# Patient Record
Sex: FEMALE | Race: WHITE | NOT HISPANIC OR LATINO
[De-identification: names, ages, dates, MRNs, and addresses within clinical notes are randomized per-mention and may not be internally consistent; named-entity substitution may affect disease eponyms.]

---

## 2017-02-20 ENCOUNTER — RX RENEWAL (OUTPATIENT)
Age: 67
End: 2017-02-20

## 2017-03-20 ENCOUNTER — RX RENEWAL (OUTPATIENT)
Age: 67
End: 2017-03-20

## 2017-04-19 ENCOUNTER — RX RENEWAL (OUTPATIENT)
Age: 67
End: 2017-04-19

## 2017-05-08 ENCOUNTER — RX RENEWAL (OUTPATIENT)
Age: 67
End: 2017-05-08

## 2017-07-10 ENCOUNTER — RX RENEWAL (OUTPATIENT)
Age: 67
End: 2017-07-10

## 2018-12-15 ENCOUNTER — TRANSCRIPTION ENCOUNTER (OUTPATIENT)
Age: 68
End: 2018-12-15

## 2018-12-16 ENCOUNTER — EMERGENCY (EMERGENCY)
Facility: HOSPITAL | Age: 68
LOS: 0 days | Discharge: HOME | End: 2018-12-16
Attending: EMERGENCY MEDICINE | Admitting: EMERGENCY MEDICINE
Payer: COMMERCIAL

## 2018-12-16 VITALS — WEIGHT: 126.99 LBS | HEIGHT: 62 IN

## 2018-12-16 VITALS
TEMPERATURE: 99 F | HEART RATE: 59 BPM | OXYGEN SATURATION: 100 % | SYSTOLIC BLOOD PRESSURE: 152 MMHG | RESPIRATION RATE: 18 BRPM | DIASTOLIC BLOOD PRESSURE: 78 MMHG

## 2018-12-16 DIAGNOSIS — R21 RASH AND OTHER NONSPECIFIC SKIN ERUPTION: ICD-10-CM

## 2018-12-16 DIAGNOSIS — B02.9 ZOSTER WITHOUT COMPLICATIONS: ICD-10-CM

## 2018-12-16 PROCEDURE — 99284 EMERGENCY DEPT VISIT MOD MDM: CPT

## 2018-12-16 RX ORDER — MORPHINE SULFATE 50 MG/1
4 CAPSULE, EXTENDED RELEASE ORAL ONCE
Qty: 0 | Refills: 0 | Status: DISCONTINUED | OUTPATIENT
Start: 2018-12-16 | End: 2018-12-16

## 2018-12-16 RX ORDER — ACYCLOVIR SODIUM 500 MG
600 VIAL (EA) INTRAVENOUS ONCE
Qty: 0 | Refills: 0 | Status: COMPLETED | OUTPATIENT
Start: 2018-12-16 | End: 2018-12-16

## 2018-12-16 RX ORDER — ONDANSETRON 8 MG/1
4 TABLET, FILM COATED ORAL ONCE
Qty: 0 | Refills: 0 | Status: COMPLETED | OUTPATIENT
Start: 2018-12-16 | End: 2018-12-16

## 2018-12-16 RX ADMIN — MORPHINE SULFATE 4 MILLIGRAM(S): 50 CAPSULE, EXTENDED RELEASE ORAL at 18:54

## 2018-12-16 RX ADMIN — ONDANSETRON 4 MILLIGRAM(S): 8 TABLET, FILM COATED ORAL at 18:54

## 2018-12-16 RX ADMIN — Medication 112 MILLIGRAM(S): at 20:53

## 2018-12-16 NOTE — ED STATDOCS - OBJECTIVE STATEMENT
69 y/o female with no relevant PMHx presents to the ED c/o Shingles rash on left side of face. Pt notes she was seen at urgent care, diagnosed with Shingles and prescribed Veltrax 4 days ago. Pt reports when she woke up this AM, she had swelling of face and worsening pain, describes the pain as "feeling it in the bone".

## 2018-12-16 NOTE — ED STATDOCS - SKIN, MLM
skin normal color for race, warm, dry and intact. +shingles left side of face, V1 V2 V3 region, no overlying cellulitis

## 2018-12-16 NOTE — ED STATDOCS - MEDICAL DECISION MAKING DETAILS
IV acyclovir Pt with Zoster to the face.  No eye involvement.  Discussed with Dr. Mitchell on phone.  Agrees with IV dose acylcovir in ED, continue valtrex at home, pain meds, f/u as outpatient.

## 2018-12-16 NOTE — ED STATDOCS - PROGRESS NOTE DETAILS
Carolina MENESES for Dr. Prince: 69 y/o female with no relevant PMHx  presents to the ED c/o Shingles rash on left side of face. Pt notes she was seen at urgent care, diagnosed with Shingles and prescribed Veltrax 4 days ago. Pt reports when she woke up this AM, she had swelling of face and worsening pain, describes it as "feeling it in the bone". Pt with Shingles in V1, V2, V3, concerned for disseminated herpes Zoster, will send pt to main ED for further evaluation. Spoke with infectious disease Dr. Mitchell who advised pt can be discharged on oral anti-virals, will not need to be admitted for IV anti-virals, will give dose of Iv acyclovir and plan to d/c home c/w valtrex which was prescribed by urgent care.  Gabbie Bnada PA-C Pt feeling better after pain control, IV acyclovir given.  Plan to d/c home with outpt f/u with PMD, strict return precautions given if shingles spread to the eye, or worsen, pt verbalized understanding.  Plan to d/c home with outpt f/u, pt already has a valtrex Rx given by urgent care, pt agreeable to d/c and plan of care, all questions answered, return precautions given  Gabbie Banda PA-C Pt feeling better after pain control, IV acyclovir given.  Plan to d/c home with outpt f/u with PMD, strict return precautions given if shingles spreads to the eye, or sx worsen, pt verbalized understanding.  Plan to d/c home with outpt f/u, pt already has a valtrex Rx given by urgent care, pt agreeable to d/c and plan of care, all questions answered, return precautions given  Gabbie Banda PA-C

## 2018-12-16 NOTE — ED ADULT NURSE NOTE - OBJECTIVE STATEMENT
Pt reports L ear pain Thursday night. Seen at /prescribed medication. Pt awoke this am with painful rash to L face from eye to chin.

## 2019-01-09 ENCOUNTER — TRANSCRIPTION ENCOUNTER (OUTPATIENT)
Age: 69
End: 2019-01-09

## 2019-02-20 PROBLEM — B02.9 ZOSTER WITHOUT COMPLICATIONS: Chronic | Status: ACTIVE | Noted: 2018-12-16

## 2019-03-04 ENCOUNTER — APPOINTMENT (OUTPATIENT)
Dept: CARDIOLOGY | Facility: CLINIC | Age: 69
End: 2019-03-04
Payer: MEDICARE

## 2019-03-04 ENCOUNTER — NON-APPOINTMENT (OUTPATIENT)
Age: 69
End: 2019-03-04

## 2019-03-04 VITALS
HEIGHT: 61 IN | SYSTOLIC BLOOD PRESSURE: 141 MMHG | WEIGHT: 147 LBS | OXYGEN SATURATION: 98 % | HEART RATE: 68 BPM | BODY MASS INDEX: 27.75 KG/M2 | DIASTOLIC BLOOD PRESSURE: 76 MMHG

## 2019-03-04 PROCEDURE — 99214 OFFICE O/P EST MOD 30 MIN: CPT | Mod: 25

## 2019-03-04 PROCEDURE — 93000 ELECTROCARDIOGRAM COMPLETE: CPT

## 2019-03-04 RX ORDER — ATORVASTATIN CALCIUM 40 MG/1
40 TABLET, FILM COATED ORAL
Qty: 1 | Refills: 0 | Status: DISCONTINUED | COMMUNITY
Start: 2017-02-20 | End: 2019-03-04

## 2019-03-04 NOTE — REASON FOR VISIT
[Follow-Up - Clinic] : a clinic follow-up of [Abnormal ECG] : an abnormal ECG [Coronary Artery Disease] : coronary artery disease [Hyperlipidemia] : hyperlipidemia [Hypertension] : hypertension [Medication Management] : Medication management [Palpitations] : palpitations

## 2019-03-04 NOTE — REVIEW OF SYSTEMS
[Fever] : no fever [Blurry Vision] : no blurred vision [Earache] : no earache [Shortness Of Breath] : no shortness of breath [Dyspnea on exertion] : dyspnea during exertion [Chest  Pressure] : no chest pressure [Chest Pain] : no chest pain [Lower Ext Edema] : no extremity edema [Leg Claudication] : no intermittent leg claudication [Palpitations] : no palpitations [see HPI] : see HPI [Abdominal Pain] : no abdominal pain [Nausea] : no nausea [Vomiting] : no vomiting [Heartburn] : heartburn [Change in Appetite] : no change in appetite [Dysphagia] : no dysphagia [Dysuria] : no dysuria [Incontinence] : no incontinence [Joint Pain] : no joint pain [Muscle Cramps] : no muscle cramps [Skin: A Rash] : no rash: [Skin Lesions] : no skin lesions [Dizziness] : no dizziness [Convulsions] : no convulsions [Confusion] : no confusion was observed [Anxiety] : anxiety [Excessive Thirst] : no polydipsia [Easy Bleeding] : no tendency for easy bleeding

## 2019-03-04 NOTE — HISTORY OF PRESENT ILLNESS
[FreeTextEntry1] : Patient  with hx  of hypertension, gerd ,CAD D1  Gastritis who came for follow up after a long gap as she was out of the state living in [place] \par she says she is doing good    patient denies any active cardiac symptoms denies any chest pain or shortness of breath or palpitation \par \par \par Patient did have  PCI Diagonal branch ,  was placed on calcium channel blocker  .  patient coronary angiogram showed  diagonal disease severe disease which was stented , coronaries were very spasmodic while having angiography was placed on low dose norvasc ,\par \par \par \par had echocardiogram showed decreased LVEF 41%, paradoxical septal motion , with apicoseptal , mid inferoseptal wall fixed defect ,without significant ischemia , \par \par \par

## 2019-03-04 NOTE — DISCUSSION/SUMMARY
[Bundle Branch Block] : ~T bundle branch block [Hyperlipidemia] : hyperlipidemia [Hypertension] : hypertension [Stable] : stable [None] : none

## 2019-03-04 NOTE — PHYSICAL EXAM
[General Appearance - Well Developed] : well developed [Normal Conjunctiva] : the conjunctiva exhibited no abnormalities [Normal Oral Mucosa] : normal oral mucosa [Respiration, Rhythm And Depth] : normal respiratory rhythm and effort [Exaggerated Use Of Accessory Muscles For Inspiration] : no accessory muscle use [Auscultation Breath Sounds / Voice Sounds] : lungs were clear to auscultation bilaterally [Chest Palpation] : palpation of the chest revealed no abnormalities [Lungs Percussion] : the lungs were normal to percussion [Heart Rate And Rhythm] : heart rate and rhythm were normal [Heart Sounds] : normal S1 and S2 [Murmurs] : no murmurs present [Arterial Pulses Normal] : the arterial pulses were normal [Edema] : no peripheral edema present [Bowel Sounds] : normal bowel sounds [Abdomen Soft] : soft [Abdomen Tenderness] : non-tender [] : no hepato-splenomegaly [Abdomen Mass (___ Cm)] : no abdominal mass palpated [Abnormal Walk] : normal gait [Nail Clubbing] : no clubbing of the fingernails [Cyanosis, Localized] : no localized cyanosis [Skin Turgor] : normal skin turgor [Oriented To Time, Place, And Person] : oriented to person, place, and time [Impaired Insight] : insight and judgment were intact [Normal Appearance] : was normal in appearance [Neck Supple] : was supple

## 2019-03-15 ENCOUNTER — APPOINTMENT (OUTPATIENT)
Dept: CARDIOLOGY | Facility: CLINIC | Age: 69
End: 2019-03-15
Payer: MEDICARE

## 2019-03-15 PROCEDURE — 93306 TTE W/DOPPLER COMPLETE: CPT

## 2019-04-15 ENCOUNTER — NON-APPOINTMENT (OUTPATIENT)
Age: 69
End: 2019-04-15

## 2019-04-15 ENCOUNTER — APPOINTMENT (OUTPATIENT)
Dept: CARDIOLOGY | Facility: CLINIC | Age: 69
End: 2019-04-15
Payer: MEDICARE

## 2019-04-15 VITALS
OXYGEN SATURATION: 97 % | TEMPERATURE: 98.7 F | SYSTOLIC BLOOD PRESSURE: 143 MMHG | RESPIRATION RATE: 14 BRPM | HEIGHT: 61 IN | BODY MASS INDEX: 24.75 KG/M2 | HEART RATE: 92 BPM | DIASTOLIC BLOOD PRESSURE: 80 MMHG | WEIGHT: 131.06 LBS

## 2019-04-15 PROCEDURE — 99214 OFFICE O/P EST MOD 30 MIN: CPT | Mod: 25

## 2019-04-15 PROCEDURE — 93000 ELECTROCARDIOGRAM COMPLETE: CPT

## 2019-04-15 RX ORDER — OMEPRAZOLE 20 MG/1
20 CAPSULE, DELAYED RELEASE ORAL
Qty: 90 | Refills: 1 | Status: ACTIVE | COMMUNITY

## 2019-04-15 NOTE — REVIEW OF SYSTEMS
[Blurry Vision] : no blurred vision [Fever] : no fever [Shortness Of Breath] : no shortness of breath [Earache] : no earache [Chest  Pressure] : no chest pressure [Dyspnea on exertion] : not dyspnea during exertion [Lower Ext Edema] : no extremity edema [Chest Pain] : no chest pain [Palpitations] : no palpitations [Leg Claudication] : no intermittent leg claudication [see HPI] : see HPI [Abdominal Pain] : no abdominal pain [Nausea] : no nausea [Vomiting] : no vomiting [Heartburn] : no heartburn [Dysphagia] : no dysphagia [Change in Appetite] : no change in appetite [Incontinence] : no incontinence [Dysuria] : no dysuria [Joint Pain] : no joint pain [Muscle Cramps] : no muscle cramps [Skin: A Rash] : no rash: [Skin Lesions] : no skin lesions [Dizziness] : no dizziness [Convulsions] : no convulsions [Confusion] : no confusion was observed [Anxiety] : anxiety [Easy Bleeding] : no tendency for easy bleeding [Excessive Thirst] : no polydipsia

## 2019-04-15 NOTE — PHYSICAL EXAM
[General Appearance - Well Developed] : well developed [Normal Conjunctiva] : the conjunctiva exhibited no abnormalities [Respiration, Rhythm And Depth] : normal respiratory rhythm and effort [Normal Oral Mucosa] : normal oral mucosa [Auscultation Breath Sounds / Voice Sounds] : lungs were clear to auscultation bilaterally [Exaggerated Use Of Accessory Muscles For Inspiration] : no accessory muscle use [Chest Palpation] : palpation of the chest revealed no abnormalities [Lungs Percussion] : the lungs were normal to percussion [Heart Rate And Rhythm] : heart rate and rhythm were normal [Heart Sounds] : normal S1 and S2 [Arterial Pulses Normal] : the arterial pulses were normal [Murmurs] : no murmurs present [Bowel Sounds] : normal bowel sounds [Edema] : no peripheral edema present [] : no hepato-splenomegaly [Abdomen Soft] : soft [Abdomen Tenderness] : non-tender [Abdomen Mass (___ Cm)] : no abdominal mass palpated [Abnormal Walk] : normal gait [Skin Turgor] : normal skin turgor [Cyanosis, Localized] : no localized cyanosis [Nail Clubbing] : no clubbing of the fingernails [Oriented To Time, Place, And Person] : oriented to person, place, and time [Impaired Insight] : insight and judgment were intact [Normal Appearance] : was normal in appearance [Neck Supple] : was supple

## 2019-04-15 NOTE — CARDIOLOGY SUMMARY
[Fixed Defect] : fixed defect [LVEF ___%] : LVEF [unfilled]% [___] : [unfilled] [Moderate] : moderate LV dysfunction

## 2019-04-15 NOTE — HISTORY OF PRESENT ILLNESS
[FreeTextEntry1] : Patient  with hx  of hypertension, gerd ,CAD D1  Gastritis who came for follow up after the tests , Patient blood work showed elevated total cholesterol , increased the dose of medication to 20 mg po daily , her echo showed decreased EF compared to prior \par she says she is doing good    patient denies any active cardiac symptoms denies any chest pain or shortness of breath or palpitation \par \par \par Patient did have  PCI Diagonal branch ,  was placed on calcium channel blocker  .  patient coronary angiogram showed  diagonal disease severe disease which was stented , coronaries were very spasmodic while having angiography was placed on low dose norvasc ,\par \par had echocardiogram showed decreased LVEF 41%, paradoxical septal motion , with apicoseptal , mid inferoseptal wall fixed defect ,without significant ischemia , \par \par \par

## 2019-05-07 ENCOUNTER — APPOINTMENT (OUTPATIENT)
Dept: CARDIOLOGY | Facility: CLINIC | Age: 69
End: 2019-05-07
Payer: MEDICARE

## 2019-05-07 PROCEDURE — A9500: CPT

## 2019-05-07 PROCEDURE — 93015 CV STRESS TEST SUPVJ I&R: CPT

## 2019-05-07 PROCEDURE — 78452 HT MUSCLE IMAGE SPECT MULT: CPT

## 2019-07-15 ENCOUNTER — APPOINTMENT (OUTPATIENT)
Dept: CARDIOLOGY | Facility: CLINIC | Age: 69
End: 2019-07-15

## 2019-08-06 ENCOUNTER — TRANSCRIPTION ENCOUNTER (OUTPATIENT)
Age: 69
End: 2019-08-06

## 2019-09-23 ENCOUNTER — MEDICATION RENEWAL (OUTPATIENT)
Age: 69
End: 2019-09-23

## 2019-12-27 ENCOUNTER — RX RENEWAL (OUTPATIENT)
Age: 69
End: 2019-12-27

## 2019-12-30 ENCOUNTER — MEDICATION RENEWAL (OUTPATIENT)
Age: 69
End: 2019-12-30

## 2019-12-30 ENCOUNTER — RX RENEWAL (OUTPATIENT)
Age: 69
End: 2019-12-30

## 2020-03-13 ENCOUNTER — RX RENEWAL (OUTPATIENT)
Age: 70
End: 2020-03-13

## 2020-04-06 ENCOUNTER — RX RENEWAL (OUTPATIENT)
Age: 70
End: 2020-04-06

## 2020-06-25 ENCOUNTER — RX RENEWAL (OUTPATIENT)
Age: 70
End: 2020-06-25

## 2020-08-17 ENCOUNTER — TRANSCRIPTION ENCOUNTER (OUTPATIENT)
Age: 70
End: 2020-08-17

## 2020-08-24 ENCOUNTER — NON-APPOINTMENT (OUTPATIENT)
Age: 70
End: 2020-08-24

## 2020-08-24 ENCOUNTER — APPOINTMENT (OUTPATIENT)
Dept: CARDIOLOGY | Facility: CLINIC | Age: 70
End: 2020-08-24
Payer: MEDICARE

## 2020-08-24 VITALS
OXYGEN SATURATION: 96 % | BODY MASS INDEX: 24.92 KG/M2 | HEART RATE: 93 BPM | WEIGHT: 132 LBS | DIASTOLIC BLOOD PRESSURE: 92 MMHG | HEIGHT: 61 IN | SYSTOLIC BLOOD PRESSURE: 151 MMHG

## 2020-08-24 PROCEDURE — 99214 OFFICE O/P EST MOD 30 MIN: CPT

## 2020-08-24 PROCEDURE — 93000 ELECTROCARDIOGRAM COMPLETE: CPT

## 2020-08-24 NOTE — PHYSICAL EXAM
[General Appearance - Well Developed] : well developed [Normal Conjunctiva] : the conjunctiva exhibited no abnormalities [Normal Oral Mucosa] : normal oral mucosa [Respiration, Rhythm And Depth] : normal respiratory rhythm and effort [Exaggerated Use Of Accessory Muscles For Inspiration] : no accessory muscle use [Auscultation Breath Sounds / Voice Sounds] : lungs were clear to auscultation bilaterally [Chest Palpation] : palpation of the chest revealed no abnormalities [Lungs Percussion] : the lungs were normal to percussion [Heart Sounds] : normal S1 and S2 [Murmurs] : no murmurs present [Heart Rate And Rhythm] : heart rate and rhythm were normal [Arterial Pulses Normal] : the arterial pulses were normal [Edema] : no peripheral edema present [Abdomen Soft] : soft [Bowel Sounds] : normal bowel sounds [Abdomen Tenderness] : non-tender [Abdomen Mass (___ Cm)] : no abdominal mass palpated [] : no hepato-splenomegaly [Abnormal Walk] : normal gait [Nail Clubbing] : no clubbing of the fingernails [Cyanosis, Localized] : no localized cyanosis [Oriented To Time, Place, And Person] : oriented to person, place, and time [Skin Turgor] : normal skin turgor [Impaired Insight] : insight and judgment were intact [Normal Appearance] : was normal in appearance [Neck Supple] : was supple

## 2020-08-24 NOTE — HISTORY OF PRESENT ILLNESS
[FreeTextEntry1] : Patient  with hx  of hypertension, gerd ,CAD D1  Gastritis who came for follow up after prolonged gap , patient ran out of the medication  for last one week ,\par \par  Patient blood work showed elevated total cholesterol , increased the dose of medication to 20 mg po daily , her echo showed decreased EF compared to prior \par \par she says she is doing good    patient denies any active cardiac symptoms denies any chest pain or shortness of breath or palpitation  biking regularly 13 blocks , \par \par \par Patient did have  PCI Diagonal branch ,  was placed on calcium channel blocker  .  patient coronary angiogram showed  diagonal disease severe disease which was stented , coronaries were very spasmodic while having angiography was placed on low dose norvasc ,\par \par had echocardiogram showed decreased LVEF 41%, paradoxical septal motion , with apicoseptal , mid inferoseptal wall fixed defect ,without significant ischemia , \par \par \par

## 2020-08-24 NOTE — CARDIOLOGY SUMMARY
[Fixed Defect] : fixed defect [LVEF ___%] : LVEF [unfilled]% [Moderate] : moderate LV dysfunction [___] : [unfilled] [___] : [unfilled]

## 2020-08-24 NOTE — REASON FOR VISIT
[Abnormal ECG] : an abnormal ECG [Follow-Up - Clinic] : a clinic follow-up of [Hypertension] : hypertension [Hyperlipidemia] : hyperlipidemia [Coronary Artery Disease] : coronary artery disease [Medication Management] : Medication management [Palpitations] : palpitations

## 2020-08-24 NOTE — REVIEW OF SYSTEMS
[see HPI] : see HPI [Anxiety] : anxiety [Blurry Vision] : no blurred vision [Fever] : no fever [Earache] : no earache [Shortness Of Breath] : no shortness of breath [Dyspnea on exertion] : not dyspnea during exertion [Chest Pain] : no chest pain [Chest  Pressure] : no chest pressure [Leg Claudication] : no intermittent leg claudication [Palpitations] : no palpitations [Lower Ext Edema] : no extremity edema [Nausea] : no nausea [Abdominal Pain] : no abdominal pain [Vomiting] : no vomiting [Heartburn] : no heartburn [Dysphagia] : no dysphagia [Change in Appetite] : no change in appetite [Dysuria] : no dysuria [Joint Pain] : no joint pain [Incontinence] : no incontinence [Skin Lesions] : no skin lesions [Muscle Cramps] : no muscle cramps [Skin: A Rash] : no rash: [Confusion] : no confusion was observed [Dizziness] : no dizziness [Convulsions] : no convulsions [Excessive Thirst] : no polydipsia [Easy Bleeding] : no tendency for easy bleeding

## 2020-09-17 ENCOUNTER — APPOINTMENT (OUTPATIENT)
Dept: CARDIOLOGY | Facility: CLINIC | Age: 70
End: 2020-09-17
Payer: MEDICARE

## 2020-09-17 PROCEDURE — 93880 EXTRACRANIAL BILAT STUDY: CPT

## 2020-09-17 PROCEDURE — 93306 TTE W/DOPPLER COMPLETE: CPT

## 2020-10-19 ENCOUNTER — NON-APPOINTMENT (OUTPATIENT)
Age: 70
End: 2020-10-19

## 2020-10-19 ENCOUNTER — APPOINTMENT (OUTPATIENT)
Dept: CARDIOLOGY | Facility: CLINIC | Age: 70
End: 2020-10-19
Payer: MEDICARE

## 2020-10-19 VITALS
OXYGEN SATURATION: 94 % | DIASTOLIC BLOOD PRESSURE: 76 MMHG | SYSTOLIC BLOOD PRESSURE: 129 MMHG | HEART RATE: 69 BPM | HEIGHT: 61 IN

## 2020-10-19 PROCEDURE — 99214 OFFICE O/P EST MOD 30 MIN: CPT | Mod: 25

## 2020-10-19 PROCEDURE — 93000 ELECTROCARDIOGRAM COMPLETE: CPT

## 2020-10-19 PROCEDURE — 99072 ADDL SUPL MATRL&STAF TM PHE: CPT

## 2020-10-19 NOTE — CARDIOLOGY SUMMARY
[Fixed Defect] : fixed defect [___] : [unfilled] [LVEF ___%] : LVEF [unfilled]% [Moderate] : moderate LV dysfunction

## 2020-10-20 NOTE — PHYSICAL EXAM
[Normal Appearance] : normal appearance [General Appearance - Well Developed] : well developed [Well Groomed] : well groomed [No Deformities] : no deformities [General Appearance - Well Nourished] : well nourished [General Appearance - In No Acute Distress] : no acute distress [Respiration, Rhythm And Depth] : normal respiratory rhythm and effort [] : no respiratory distress [Auscultation Breath Sounds / Voice Sounds] : lungs were clear to auscultation bilaterally [Exaggerated Use Of Accessory Muscles For Inspiration] : no accessory muscle use [Heart Rate And Rhythm] : heart rate and rhythm were normal [Heart Sounds] : normal S1 and S2 [Abnormal Walk] : normal gait [Abdomen Soft] : soft [Skin Color & Pigmentation] : normal skin color and pigmentation [Oriented To Time, Place, And Person] : oriented to person, place, and time [FreeTextEntry1] : No LE Edema

## 2020-10-20 NOTE — DISCUSSION/SUMMARY
[FreeTextEntry1] : CAD: CC of fatigue stress test ordered ( discussed cardiac cath patient at this time declines ) \par \par HLD:  Cholesterol  profile on prior labs not well controlled  LDL not at goal.  Relates st the time of her last blood draw She was off medication which she has since resumed.  Will repeat labs ( To also include repeat calcium/PTH ) \par \par LV Dysfunction ( prior EF 41% ) will suspend Norvasc increase Losartan to 50 MG QD ( Consider  Entresto ) OV  2 weeks \par \par DW Dr Palla

## 2020-10-20 NOTE — HISTORY OF PRESENT ILLNESS
[FreeTextEntry1] : This is a 71 Y/O female PMH: CAD/Stent 2013 D1,  CC 2016 Patent D1 stent Moderate disease RCA, LCX ( _ FFR ) NL LV fX , Abn EKG LBBB Here today in routine cardiac follow up with CC of Fatigue No CP/SOB/Palpitations \par \par Relates she is intolerant to Imdur and Ranexa  to date 2/2 rash.    \par \par Labs 9/17/20 lipids poorly controlled LDL not at goal.  Reports at that time she was of her statin for 1-2 weeks as she ran out of medications she has since been taking daily

## 2020-10-20 NOTE — REASON FOR VISIT
[Medication Management] : Medication management [Follow-Up - Clinic] : a clinic follow-up of [Coronary Artery Disease] : coronary artery disease [Fatigue] : feeling tired (fatigue) [Hypertension] : hypertension

## 2020-11-05 ENCOUNTER — APPOINTMENT (OUTPATIENT)
Dept: CARDIOLOGY | Facility: CLINIC | Age: 70
End: 2020-11-05
Payer: MEDICARE

## 2020-11-05 PROCEDURE — 78452 HT MUSCLE IMAGE SPECT MULT: CPT

## 2020-11-05 PROCEDURE — 99072 ADDL SUPL MATRL&STAF TM PHE: CPT

## 2020-11-05 PROCEDURE — 93015 CV STRESS TEST SUPVJ I&R: CPT

## 2020-11-05 PROCEDURE — A9500: CPT

## 2020-11-10 ENCOUNTER — APPOINTMENT (OUTPATIENT)
Dept: CARDIOLOGY | Facility: CLINIC | Age: 70
End: 2020-11-10
Payer: MEDICARE

## 2020-11-10 VITALS
OXYGEN SATURATION: 95 % | TEMPERATURE: 97.3 F | HEIGHT: 61 IN | SYSTOLIC BLOOD PRESSURE: 127 MMHG | WEIGHT: 132 LBS | RESPIRATION RATE: 14 BRPM | DIASTOLIC BLOOD PRESSURE: 84 MMHG | BODY MASS INDEX: 24.92 KG/M2 | HEART RATE: 89 BPM

## 2020-11-10 PROCEDURE — 99214 OFFICE O/P EST MOD 30 MIN: CPT

## 2020-11-10 NOTE — HISTORY OF PRESENT ILLNESS
[FreeTextEntry1] : This is a 69 Y/O female PMH: CAD/Stent 2013 D1,  CC 2016 Patent D1 stent Moderate disease RCA, LCX ( _ FFR ) NL LV fX , Abn EKG LBBB Here today in routine cardiac follow up after having recommended test , patient says she is doing well patient had stress test showing fixed anteroseptal anterior ,apical , inferoseptal wall with EF 67%  no ischemia noted     \par \par Patient denies any chest pain or short ness of breath \par \par Relates she is intolerant to Imdur and Ranexa  to date 2/2 rash.    \par \par Labs 9/17/20 lipids poorly controlled LDL not at goal.  Reports at that time she was of her statin for 1-2 weeks as she ran out of medications she has since been taking daily

## 2020-11-10 NOTE — REASON FOR VISIT
[Follow-Up - Clinic] : a clinic follow-up of [Coronary Artery Disease] : coronary artery disease [Fatigue] : feeling tired (fatigue) [Hypertension] : hypertension [Medication Management] : Medication management

## 2020-11-10 NOTE — PHYSICAL EXAM
[General Appearance - Well Developed] : well developed [Normal Appearance] : normal appearance [Well Groomed] : well groomed [General Appearance - Well Nourished] : well nourished [No Deformities] : no deformities [General Appearance - In No Acute Distress] : no acute distress [] : no respiratory distress [Respiration, Rhythm And Depth] : normal respiratory rhythm and effort [Exaggerated Use Of Accessory Muscles For Inspiration] : no accessory muscle use [Auscultation Breath Sounds / Voice Sounds] : lungs were clear to auscultation bilaterally [Heart Rate And Rhythm] : heart rate and rhythm were normal [Heart Sounds] : normal S1 and S2 [Abdomen Soft] : soft [Abnormal Walk] : normal gait [FreeTextEntry1] : No LE Edema  [Skin Color & Pigmentation] : normal skin color and pigmentation [Oriented To Time, Place, And Person] : oriented to person, place, and time

## 2020-11-10 NOTE — REVIEW OF SYSTEMS
[Eyeglasses] : not currently wearing eyeglasses [Earache] : no earache [Mouth Sores] : no mouth sores [Negative] : Heme/Lymph

## 2020-11-10 NOTE — DISCUSSION/SUMMARY
[FreeTextEntry1] : CAD:  no evidence of ischemia , showed fixed defects possibly due to LBBB  ,   explained to patient , advised the patient to seek medical attention if she develops any symptoms , continue BB statin ecotrin \par \par ( discussed cardiac cath patient at this time declines ) \par \par HLD:  Cholesterol  profile on prior labs not well controlled  LDL not at goal. restarted on medication   Will repeat labs ( To also include repeat calcium/PTH ) \par \par LV Dysfunction ( prior EF 41% )  continue BB and losartan   Losartan to 50 MG QD ( Consider  Entresto ) \par

## 2021-02-19 ENCOUNTER — RX RENEWAL (OUTPATIENT)
Age: 71
End: 2021-02-19

## 2021-03-04 ENCOUNTER — OUTPATIENT (OUTPATIENT)
Dept: OUTPATIENT SERVICES | Facility: HOSPITAL | Age: 71
LOS: 1 days | End: 2021-03-04
Payer: MEDICARE

## 2021-03-04 DIAGNOSIS — Z20.828 CONTACT WITH AND (SUSPECTED) EXPOSURE TO OTHER VIRAL COMMUNICABLE DISEASES: ICD-10-CM

## 2021-03-04 LAB — SARS-COV-2 RNA SPEC QL NAA+PROBE: SIGNIFICANT CHANGE UP

## 2021-03-04 PROCEDURE — U0003: CPT

## 2021-03-04 PROCEDURE — C9803: CPT

## 2021-03-04 PROCEDURE — U0005: CPT

## 2021-03-05 DIAGNOSIS — Z20.828 CONTACT WITH AND (SUSPECTED) EXPOSURE TO OTHER VIRAL COMMUNICABLE DISEASES: ICD-10-CM

## 2021-03-29 ENCOUNTER — NON-APPOINTMENT (OUTPATIENT)
Age: 71
End: 2021-03-29

## 2021-03-29 ENCOUNTER — APPOINTMENT (OUTPATIENT)
Dept: CARDIOLOGY | Facility: CLINIC | Age: 71
End: 2021-03-29
Payer: MEDICARE

## 2021-03-29 VITALS — DIASTOLIC BLOOD PRESSURE: 80 MMHG | SYSTOLIC BLOOD PRESSURE: 136 MMHG

## 2021-03-29 VITALS
SYSTOLIC BLOOD PRESSURE: 147 MMHG | DIASTOLIC BLOOD PRESSURE: 85 MMHG | OXYGEN SATURATION: 98 % | BODY MASS INDEX: 26.06 KG/M2 | WEIGHT: 138 LBS | HEIGHT: 61 IN | HEART RATE: 78 BPM

## 2021-03-29 PROCEDURE — 99072 ADDL SUPL MATRL&STAF TM PHE: CPT

## 2021-03-29 PROCEDURE — 93000 ELECTROCARDIOGRAM COMPLETE: CPT

## 2021-03-29 PROCEDURE — 99214 OFFICE O/P EST MOD 30 MIN: CPT

## 2021-03-29 NOTE — REVIEW OF SYSTEMS
[Fever] : no fever [Eyeglasses] : not currently wearing eyeglasses [Earache] : no earache [Mouth Sores] : no mouth sores [Negative] : Heme/Lymph

## 2021-03-29 NOTE — HISTORY OF PRESENT ILLNESS
[FreeTextEntry1] : This is a 69 Y/O female PMH: CAD/Stent 2013 D1,  CC 2016 Patent D1 stent Moderate disease RCA, LCX ( _ FFR ) NL LV fX , Abn EKG LBBB Here today in routine cardiac follow up , patient says she is doing well   patient blood pressure is mild elevated as she is not compliant to low salt diet \par \par \par patient had stress test showing fixed anteroseptal anterior ,apical , inferoseptal wall with EF 67%  no ischemia noted     \par \par Patient denies any chest pain or short ness of breath on routine activity , does feel shortness on usual actiivty \par \par Relates she is intolerant to Imdur and Ranexa  to date 2/2 rash.    \par \par Labs 9/17/20 lipids poorly controlled LDL not at goal.  Reports at that time she was off her statin for 1-2 weeks as she ran out of medications she has since been taking daily

## 2021-03-29 NOTE — DISCUSSION/SUMMARY
[FreeTextEntry1] : CAD:  no evidence of ischemia , showed fixed defects possibly due to LBBB  ,   explained to patient , advised the patient to seek medical attention if she develops any symptoms , continue BB statin ecotrin \par \par ( discussed cardiac cath patient at this time declines ) \par \par HLD:  Cholesterol  profile on prior labs not well controlled  LDL not at goal. restarted on medication   Will repeat labs ( To also include repeat calcium/PTH ) . patient did not have yet  \par \par LV Dysfunction ( prior EF 41% )  continue BB and losartan   Losartan to 50 MG QD ( Consider  Entresto )  daughter will let me know after discussing with pharmacy \par \par \par HTN   mild uncontrolled , low salt diet , continue   current medication \par

## 2021-04-18 ENCOUNTER — TRANSCRIPTION ENCOUNTER (OUTPATIENT)
Age: 71
End: 2021-04-18

## 2021-05-23 ENCOUNTER — RX RENEWAL (OUTPATIENT)
Age: 71
End: 2021-05-23

## 2021-07-06 ENCOUNTER — APPOINTMENT (OUTPATIENT)
Dept: CARDIOLOGY | Facility: CLINIC | Age: 71
End: 2021-07-06

## 2021-08-18 ENCOUNTER — RX RENEWAL (OUTPATIENT)
Age: 71
End: 2021-08-18

## 2021-12-20 ENCOUNTER — NON-APPOINTMENT (OUTPATIENT)
Age: 71
End: 2021-12-20

## 2021-12-20 ENCOUNTER — APPOINTMENT (OUTPATIENT)
Dept: CARDIOLOGY | Facility: CLINIC | Age: 71
End: 2021-12-20
Payer: MEDICARE

## 2021-12-20 VITALS — HEIGHT: 61 IN | WEIGHT: 132 LBS | HEART RATE: 81 BPM | BODY MASS INDEX: 24.92 KG/M2 | OXYGEN SATURATION: 97 %

## 2021-12-20 VITALS — SYSTOLIC BLOOD PRESSURE: 120 MMHG | DIASTOLIC BLOOD PRESSURE: 80 MMHG

## 2021-12-20 PROCEDURE — 93000 ELECTROCARDIOGRAM COMPLETE: CPT

## 2021-12-20 PROCEDURE — 99214 OFFICE O/P EST MOD 30 MIN: CPT

## 2021-12-20 RX ORDER — LOSARTAN POTASSIUM 50 MG/1
50 TABLET, FILM COATED ORAL DAILY
Qty: 90 | Refills: 2 | Status: DISCONTINUED | COMMUNITY
Start: 2019-04-15 | End: 2021-12-20

## 2021-12-20 NOTE — HISTORY OF PRESENT ILLNESS
[FreeTextEntry1] : This is a 69 Y/O female PMH: CAD/Stent 2013 D1,  CC 2016 Patent D1 stent Moderate disease RCA, LCX ( _ FFR ) NL LV fX , Abn EKG LBBB Here today in routine cardiac follow up , patient says she is doing well   patient blood pressure is mild elevated as she is not compliant to low salt diet \par \par \par patient had stress test showing fixed anteroseptal anterior ,apical , inferoseptal wall with EF 67%  no ischemia noted     \par \par Patient denies any chest pain or short ness of breath on routine activity , does feel shortness on usual actiivty \par \par Relates she is intolerant to Imdur and Ranexa  to date 2/2 rash.    \par \par Labs 12/13/21  lipids poorly controlled LDL not at goal  120 , patient says she is compliant to medication

## 2021-12-20 NOTE — CARDIOLOGY SUMMARY
[de-identified] : 12/20/21 sinus rhyhm LBBB [de-identified] : 11/5/20  medium size mild to moderate fixed anterior ,anteroseptal , inferoseptal ,apical wall defect without ischemia EF 67% [de-identified] : 9/17/20   mild paradoxical septal motion moderate LV dysfunction EF 40-45% [de-identified] : 8/16/2013     \par \par 8/16/13 Stent diagonal branch

## 2021-12-20 NOTE — DISCUSSION/SUMMARY
[FreeTextEntry1] : CAD:  no evidence of ischemia , showed fixed defects possibly due to LBBB  ,   explained to patient , advised the patient to seek medical attention if she develops any symptoms , continue BB statin ecotrin \par \par ( discussed cardiac cath patient at this time declines ) \par \par HLD:  Cholesterol  profile on prior labs not well controlled  LDL not at goal. restarted on medication  will increase atorvastatin 20 mg po 40 mg po daily ,  \par \par LV Dysfunction ( prior EF 41% )  continue BB and losartan   Losartan  25 MG QD ( Consider  Entresto )  daughter will let me know after discussing with pharmacy \par \par \par HTN  controlled , low salt diet , continue   current medication \par

## 2022-03-04 ENCOUNTER — RX RENEWAL (OUTPATIENT)
Age: 72
End: 2022-03-04

## 2022-04-25 ENCOUNTER — APPOINTMENT (OUTPATIENT)
Dept: CARDIOLOGY | Facility: CLINIC | Age: 72
End: 2022-04-25
Payer: MEDICARE

## 2022-04-25 ENCOUNTER — NON-APPOINTMENT (OUTPATIENT)
Age: 72
End: 2022-04-25

## 2022-04-25 VITALS
SYSTOLIC BLOOD PRESSURE: 124 MMHG | WEIGHT: 134 LBS | OXYGEN SATURATION: 96 % | HEART RATE: 77 BPM | HEIGHT: 61 IN | BODY MASS INDEX: 25.3 KG/M2 | DIASTOLIC BLOOD PRESSURE: 84 MMHG

## 2022-04-25 PROCEDURE — 99214 OFFICE O/P EST MOD 30 MIN: CPT

## 2022-04-25 PROCEDURE — 93000 ELECTROCARDIOGRAM COMPLETE: CPT

## 2022-04-25 NOTE — HISTORY OF PRESENT ILLNESS
[FreeTextEntry1] : This is a 71 Y/O female PMH: CAD/Stent 2013 D1,  CC 2016 Patent D1 stent Moderate disease RCA, LCX ( _ FFR ) NL LV fX , Abn EKG LBBB Here today in routine cardiac follow up , patient says she is doing well   patient blood pressure is improved \par \par patient had stress test showing fixed anteroseptal anterior ,apical , inferoseptal wall with EF 67%  no ischemia noted     \par \par Patient denies any chest pain or short ness of breath on routine activity , does feel shortness on usual actiivty \par \par Relates she is intolerant to Imdur and Ranexa  to date 2/2 rash.    \par \par Labs 4/16/22    LDL 98 HDl 61  , patient says she is compliant to medication \par serum calcium 10.8

## 2022-04-25 NOTE — DISCUSSION/SUMMARY
[FreeTextEntry1] : CAD:  no evidence of ischemia , showed fixed defects possibly due to LBBB  ,   explained to patient , advised the patient to seek medical attention if she develops any symptoms , continue BB statin ecotrin \par \par HLD: improving lipid status   LDL not at goal. restarted on medication  will increase atorvastatin 20 mg po 40 mg po daily ,  \par \par LV Dysfunction ( prior EF 41% )  continue BB and losartan   Losartan  25 MG QD ( Consider  Entresto )  daughter will let me know after discussing with pharmacy \par \par \par HTN  controlled , low salt diet , continue   current medication \par \par MIld elevated calcium level K level , increase fluid intake , repeat blood work \par

## 2022-04-25 NOTE — CARDIOLOGY SUMMARY
[de-identified] : 4/25/22  sinus rhyhm LBBB [de-identified] : 11/5/20  medium size mild to moderate fixed anterior ,anteroseptal , inferoseptal ,apical wall defect without ischemia EF 67% [de-identified] : 9/17/20   mild paradoxical septal motion moderate LV dysfunction EF 40-45% [de-identified] : 8/16/2013     \par \par 8/16/13 Stent diagonal branch [de-identified] : 9/17/20  mild plaque in carotid , no stenosis

## 2022-06-04 ENCOUNTER — RX RENEWAL (OUTPATIENT)
Age: 72
End: 2022-06-04

## 2022-06-29 ENCOUNTER — RX RENEWAL (OUTPATIENT)
Age: 72
End: 2022-06-29

## 2022-08-29 ENCOUNTER — APPOINTMENT (OUTPATIENT)
Dept: CARDIOLOGY | Facility: CLINIC | Age: 72
End: 2022-08-29

## 2022-09-01 ENCOUNTER — RX RENEWAL (OUTPATIENT)
Age: 72
End: 2022-09-01

## 2022-10-10 ENCOUNTER — APPOINTMENT (OUTPATIENT)
Dept: CARDIOLOGY | Facility: CLINIC | Age: 72
End: 2022-10-10

## 2022-10-10 ENCOUNTER — NON-APPOINTMENT (OUTPATIENT)
Age: 72
End: 2022-10-10

## 2022-10-10 VITALS
SYSTOLIC BLOOD PRESSURE: 118 MMHG | HEART RATE: 84 BPM | BODY MASS INDEX: 24.17 KG/M2 | DIASTOLIC BLOOD PRESSURE: 77 MMHG | WEIGHT: 128 LBS | HEIGHT: 61 IN | OXYGEN SATURATION: 93 %

## 2022-10-10 DIAGNOSIS — F41.9 ANXIETY DISORDER, UNSPECIFIED: ICD-10-CM

## 2022-10-10 DIAGNOSIS — R06.02 SHORTNESS OF BREATH: ICD-10-CM

## 2022-10-10 PROCEDURE — 93000 ELECTROCARDIOGRAM COMPLETE: CPT

## 2022-10-10 PROCEDURE — 99215 OFFICE O/P EST HI 40 MIN: CPT | Mod: 25

## 2022-10-10 NOTE — HISTORY OF PRESENT ILLNESS
[FreeTextEntry1] : This is a 69 Y/O female PMH: CAD/Stent 2013 D1,  CC 2016 Patent D1 stent Moderate disease RCA, LCX ( _ FFR ) NL LV fX , Abn EKG LBBB Here today in routine cardiac follow up , complain that her shortness of  breath  on activity is slowly getting worse , not associated with chest pain , noticed last couple of months , patient is under stress , feels anxiety ,  patient does have seasonal allergies , \par \par \par  patient says she is doing well   patient blood pressure is improved \par \par patient had stress test showing fixed anteroseptal anterior ,apical , inferoseptal wall with EF 67%  no ischemia noted     \par \par Patient denies any chest pain or short ness of breath on routine activity , does feel shortness on usual actiivty \par \par Relates she is intolerant to Imdur and Ranexa  to date 2/2 rash.    \par \par Labs 4/16/22    LDL 98 HDl 61  , patient says she is compliant to medication \par serum calcium 10.8

## 2022-10-10 NOTE — CARDIOLOGY SUMMARY
[de-identified] : 4/25/22  sinus rhyhm LBBB [de-identified] : 8/16/2013     \par \par 8/16/13 Stent diagonal branch [de-identified] : 9/17/20  mild plaque in carotid , no stenosis

## 2022-10-10 NOTE — DISCUSSION/SUMMARY
[FreeTextEntry1] : \par Patient with increasing shortness of breath which is slowly getting worse   anxiety related vs seasonal allergies , will obtain echo , chemical stress test to rule out ischemia , advised to take allegra and observe \par \par CAD:  no evidence of ischemia , showed fixed defects possibly due to LBBB  ,   explained to patient , advised the patient to seek medical attention if she develops any symptoms , continue BB statin ecotrin \par \par HLD: improving lipid status   LDL not at goal. restarted on medication  will continue  atorvastatin 20 mg po 40 mg po daily ,  \par \par LV Dysfunction ( prior EF 41% )  continue BB and losartan   Losartan  25 MG QD ( Consider  Entresto )  daughter will let me know after discussing with pharmacy \par \par HTN  controlled , low salt diet , continue   current medication \par \par MIld elevated calcium level K level , increase fluid intake , repeat blood work \par

## 2022-10-20 ENCOUNTER — NON-APPOINTMENT (OUTPATIENT)
Age: 72
End: 2022-10-20

## 2023-04-20 ENCOUNTER — RX RENEWAL (OUTPATIENT)
Age: 73
End: 2023-04-20

## 2023-05-08 ENCOUNTER — RX RENEWAL (OUTPATIENT)
Age: 73
End: 2023-05-08

## 2023-05-26 DIAGNOSIS — R53.83 OTHER FATIGUE: ICD-10-CM

## 2023-05-26 DIAGNOSIS — I25.10 ATHEROSCLEROTIC HEART DISEASE OF NATIVE CORONARY ARTERY W/OUT ANGINA PECTORIS: ICD-10-CM

## 2023-06-02 ENCOUNTER — RX RENEWAL (OUTPATIENT)
Age: 73
End: 2023-06-02

## 2023-06-19 ENCOUNTER — RX RENEWAL (OUTPATIENT)
Age: 73
End: 2023-06-19

## 2023-06-19 ENCOUNTER — NON-APPOINTMENT (OUTPATIENT)
Age: 73
End: 2023-06-19

## 2023-06-19 ENCOUNTER — APPOINTMENT (OUTPATIENT)
Dept: CARDIOLOGY | Facility: CLINIC | Age: 73
End: 2023-06-19
Payer: MEDICARE

## 2023-06-19 VITALS
SYSTOLIC BLOOD PRESSURE: 112 MMHG | BODY MASS INDEX: 24.55 KG/M2 | WEIGHT: 130 LBS | HEART RATE: 70 BPM | OXYGEN SATURATION: 95 % | DIASTOLIC BLOOD PRESSURE: 62 MMHG | HEIGHT: 61 IN

## 2023-06-19 DIAGNOSIS — R73.03 PREDIABETES.: ICD-10-CM

## 2023-06-19 PROCEDURE — 99214 OFFICE O/P EST MOD 30 MIN: CPT | Mod: 25

## 2023-06-19 PROCEDURE — 93000 ELECTROCARDIOGRAM COMPLETE: CPT

## 2023-06-21 NOTE — DISCUSSION/SUMMARY
[EKG obtained to assist in diagnosis and management of assessed problem(s)] : EKG obtained to assist in diagnosis and management of assessed problem(s) [FreeTextEntry1] : \par Patient with increasing shortness of breath which is slowly getting worse   anxiety related vs seasonal allergies , will obtain echo , chemical stress test to rule out ischemia , advised to take allegra and observe \par \par CAD:  no evidence of ischemia , showed fixed defects possibly due to LBBB  ,   explained to patient , advised the patient to seek medical attention if she develops any symptoms , continue BB statin ecotrin \par \par HLD: improving lipid status   LDL not at goal. restarted on medication  will continue  atorvastatin 40 mg po daily ,  \par \par LV Dysfunction ( prior EF 41% )  continue BB and losartan   Losartan  25 MG QD ( Consider  Entresto )  daughter will let me know after discussing with pharmacy \par \par HTN  controlled , low salt diet , continue   current medication \par \par MIld elevated calcium level K level , increase fluid intake , repeat blood work \par

## 2023-06-21 NOTE — CARDIOLOGY SUMMARY
[de-identified] : 6/19/23  sinus rhyhm LBBB [de-identified] : 11/5/20  medium size mild to moderate fixed anterior ,anteroseptal , inferoseptal ,apical wall defect without ischemia EF 67% [de-identified] : 9/17/20   mild paradoxical septal motion moderate LV dysfunction EF 40-45% [de-identified] : 8/16/2013     \par \par 8/16/13 Stent diagonal branch

## 2023-06-21 NOTE — HISTORY OF PRESENT ILLNESS
[FreeTextEntry1] : This is a 69 Y/O female PMH: CAD/Stent 2013 D1,  CC 2016 Patent D1 stent Moderate disease RCA, LCX ( _ FFR ) NL LV fX , Abn EKG LBBB Here today in routine cardiac follow up ,\par \par  complain that her shortness of  breath  on activity without chest pain , became much better with taking allergy medication ,   patient does have seasonal allergies , \par \par patient says she is doing well   patient blood pressure is improved \par \par patient had stress test showing fixed anteroseptal anterior ,apical , inferoseptal wall with EF 67%  no ischemia noted     \par \par Patient denies any chest pain or short ness of breath on routine activity \par \par Relates she is intolerant to Imdur and Ranexa  to date 2/2 rash.    \par \par Labs 6/12/23    LDL 92 HDL 53  , patient says she is compliant to medication \par serum calcium 10.5  K 5.3

## 2023-06-29 RX ORDER — LOSARTAN POTASSIUM 25 MG/1
25 TABLET, FILM COATED ORAL
Qty: 90 | Refills: 1 | Status: DISCONTINUED | COMMUNITY
Start: 2021-02-19 | End: 2023-06-29

## 2023-10-16 ENCOUNTER — NON-APPOINTMENT (OUTPATIENT)
Age: 73
End: 2023-10-16

## 2023-10-16 ENCOUNTER — APPOINTMENT (OUTPATIENT)
Dept: CARDIOLOGY | Facility: CLINIC | Age: 73
End: 2023-10-16
Payer: MEDICARE

## 2023-10-16 VITALS
OXYGEN SATURATION: 94 % | DIASTOLIC BLOOD PRESSURE: 80 MMHG | HEART RATE: 80 BPM | BODY MASS INDEX: 24.35 KG/M2 | WEIGHT: 129 LBS | HEIGHT: 61 IN | SYSTOLIC BLOOD PRESSURE: 156 MMHG

## 2023-10-16 PROCEDURE — 93306 TTE W/DOPPLER COMPLETE: CPT

## 2023-10-16 PROCEDURE — 93000 ELECTROCARDIOGRAM COMPLETE: CPT

## 2023-10-16 PROCEDURE — 99214 OFFICE O/P EST MOD 30 MIN: CPT | Mod: 25

## 2023-12-26 ENCOUNTER — RX RENEWAL (OUTPATIENT)
Age: 73
End: 2023-12-26

## 2024-02-05 ENCOUNTER — NON-APPOINTMENT (OUTPATIENT)
Age: 74
End: 2024-02-05

## 2024-02-05 ENCOUNTER — APPOINTMENT (OUTPATIENT)
Dept: CARDIOLOGY | Facility: CLINIC | Age: 74
End: 2024-02-05
Payer: MEDICARE

## 2024-02-05 VITALS
HEART RATE: 75 BPM | DIASTOLIC BLOOD PRESSURE: 70 MMHG | RESPIRATION RATE: 14 BRPM | WEIGHT: 129 LBS | SYSTOLIC BLOOD PRESSURE: 130 MMHG | BODY MASS INDEX: 24.35 KG/M2 | HEIGHT: 61 IN | TEMPERATURE: 97.3 F | OXYGEN SATURATION: 95 %

## 2024-02-05 VITALS
OXYGEN SATURATION: 95 % | HEIGHT: 61 IN | DIASTOLIC BLOOD PRESSURE: 76 MMHG | SYSTOLIC BLOOD PRESSURE: 136 MMHG | BODY MASS INDEX: 24.35 KG/M2 | HEART RATE: 75 BPM | WEIGHT: 129 LBS

## 2024-02-05 DIAGNOSIS — E83.52 HYPERCALCEMIA: ICD-10-CM

## 2024-02-05 DIAGNOSIS — I10 ESSENTIAL (PRIMARY) HYPERTENSION: ICD-10-CM

## 2024-02-05 DIAGNOSIS — I50.22 CHRONIC SYSTOLIC (CONGESTIVE) HEART FAILURE: ICD-10-CM

## 2024-02-05 PROCEDURE — 93000 ELECTROCARDIOGRAM COMPLETE: CPT

## 2024-02-05 PROCEDURE — G2211 COMPLEX E/M VISIT ADD ON: CPT

## 2024-02-05 PROCEDURE — 99214 OFFICE O/P EST MOD 30 MIN: CPT

## 2024-02-05 NOTE — CARDIOLOGY SUMMARY
[de-identified] : 2/5/24  sinus rhyhm LBBB [de-identified] : 11/5/20  medium size mild to moderate fixed anterior ,anteroseptal , inferoseptal ,apical wall defect without ischemia EF 67% [de-identified] : 10/16/23 TDS  mild paradoxical septal motion moderate LV dysfunction EF 50-55%  Mild DD  [de-identified] : 8/16/2013     \par  \par  8/16/13 Stent diagonal branch

## 2024-02-05 NOTE — HISTORY OF PRESENT ILLNESS
[FreeTextEntry1] : This is a 71 Y/O female PMH: CAD/Stent 2013 D1,  CC 2016 Patent D1 stent Moderate disease RCA, LCX ( _ FFR ) NL LV fX , Abn EKG LBBB for follow up accompanied by daughter , says she is feeling ok , other than episodes of shortness of breath due to allergies , controlled with taking allergy medication ,  Patient denies any chest pain or palpitation , does bike 3 miles few times a week ,  ,   patient says she is doing well   patient blood pressure is control   patient had stress test showing fixed anteroseptal anterior ,apical , inferoseptal wall with EF 67%  no ischemia noted       Relates she is intolerant to Imdur and Ranexa  to date 2/2 rash.    , losartan was discontinued due to hyperkalemia   Labs 6/12/23    LDL 99 HDL 53  , patient says she is compliant to medication  serum calcium 10.5   now 11.4 K 5.3  now 5.6

## 2024-02-05 NOTE — DISCUSSION/SUMMARY
[FreeTextEntry1] : Patient with increasing shortness of breath which is slowly getting worse   anxiety related vs seasonal allergies ,chemical stress test to rule out ischemia , which she does not want to have ,advised to take allegra and observe   CAD:  showed fixed defects possibly due to LBBB  ,   explained to patient , advised the patient to seek medical attention if she develops any symptoms , continue BB statin ecotrin   HLD: improving lipid status   LDL not at goal. restarted on medication  will continue  atorvastatin 40 mg po daily ,    LV Dysfunction ( prior EF 41% ) stable  over estimated on echo ,  continue BB  patient did not tolerate losartan due hyperkalemia    HTN  controlled , low salt diet , continue   current medication   MIld elevated calcium level K level , increase fluid intake , repeat blood work  pTH level , avoid k rich food intake   [EKG obtained to assist in diagnosis and management of assessed problem(s)] : EKG obtained to assist in diagnosis and management of assessed problem(s)

## 2024-05-11 NOTE — PHYSICAL EXAM
[Well Developed] : well developed [Well Nourished] : well nourished [No Acute Distress] : no acute distress [Normal Conjunctiva] : normal conjunctiva [Normal Venous Pressure] : normal venous pressure [No Carotid Bruit] : no carotid bruit [Normal S1, S2] : normal S1, S2 [No Murmur] : no murmur [No Rub] : no rub [No Gallop] : no gallop [Clear Lung Fields] : clear lung fields [Good Air Entry] : good air entry [No Respiratory Distress] : no respiratory distress  [Soft] : abdomen soft [Non Tender] : non-tender [Normal Bowel Sounds] : normal bowel sounds [Normal Gait] : normal gait [No Edema] : no edema [No Cyanosis] : no cyanosis [No Varicosities] : no varicosities [No Clubbing] : no clubbing [No Rash] : no rash [No Skin Lesions] : no skin lesions [Moves all extremities] : moves all extremities [No Focal Deficits] : no focal deficits [Normal Speech] : normal speech [Alert and Oriented] : alert and oriented [Normal memory] : normal memory

## 2024-05-13 ENCOUNTER — APPOINTMENT (OUTPATIENT)
Dept: CARDIOLOGY | Facility: CLINIC | Age: 74
End: 2024-05-13
Payer: MEDICARE

## 2024-05-13 ENCOUNTER — NON-APPOINTMENT (OUTPATIENT)
Age: 74
End: 2024-05-13

## 2024-05-13 VITALS
SYSTOLIC BLOOD PRESSURE: 136 MMHG | DIASTOLIC BLOOD PRESSURE: 80 MMHG | OXYGEN SATURATION: 95 % | HEIGHT: 61 IN | HEART RATE: 68 BPM | BODY MASS INDEX: 24.17 KG/M2 | WEIGHT: 128 LBS

## 2024-05-13 DIAGNOSIS — E83.52 HYPERCALCEMIA: ICD-10-CM

## 2024-05-13 DIAGNOSIS — E78.5 HYPERLIPIDEMIA, UNSPECIFIED: ICD-10-CM

## 2024-05-13 DIAGNOSIS — I25.10 ATHEROSCLEROTIC HEART DISEASE OF NATIVE CORONARY ARTERY W/OUT ANGINA PECTORIS: ICD-10-CM

## 2024-05-13 DIAGNOSIS — I44.7 LEFT BUNDLE-BRANCH BLOCK, UNSPECIFIED: ICD-10-CM

## 2024-05-13 PROCEDURE — 99214 OFFICE O/P EST MOD 30 MIN: CPT

## 2024-05-13 PROCEDURE — 93000 ELECTROCARDIOGRAM COMPLETE: CPT

## 2024-05-13 PROCEDURE — G2211 COMPLEX E/M VISIT ADD ON: CPT

## 2024-05-13 NOTE — CARDIOLOGY SUMMARY
[de-identified] : 2/5/24  sinus rhyhm LBBB [de-identified] : 11/5/20  medium size mild to moderate fixed anterior ,anteroseptal , inferoseptal ,apical wall defect without ischemia EF 67% [de-identified] : 10/16/23 TDS  mild paradoxical septal motion moderate LV dysfunction EF 50-55%  Mild DD  [de-identified] : 8/16/2013     \par  \par  8/16/13 Stent diagonal branch [de-identified] : 9/17/20  mild Plaque right iCA

## 2024-05-13 NOTE — DISCUSSION/SUMMARY
[FreeTextEntry1] :   CAD:  showed fixed defects possibly due to LBBB  ,   explained to patient , advised the patient to seek medical attention if she develops any symptoms , continue BB statin ecotrin   HLD: improving lipid status   LDL not at goal. restarted on medication  will continue  atorvastatin 40 mg po daily ,    LV Dysfunction ( prior EF 41% ) stable  over estimated on echo ,  continue BB  patient did not tolerate losartan due hyperkalemia    HTN  controlled , low salt diet , continue   current medication   MIld elevated calcium level K level , increase fluid intake , elevated PTH , recommend endo evaluation   [EKG obtained to assist in diagnosis and management of assessed problem(s)] : EKG obtained to assist in diagnosis and management of assessed problem(s)

## 2024-05-13 NOTE — HISTORY OF PRESENT ILLNESS
[FreeTextEntry1] : Patient with hx of CAD/Stent 2013 D1,  CC 2016 Patent D1 stent Moderate disease RCA, LCX ( _ FFR ) NL LV fX , Abn EKG LBBB for follow up accompanied by daughter , says she is feeling ok , other than episodes of shortness of breath due to allergies , controlled with taking allergy medication ,  Patient denies any chest pain or palpitation , does bike 3 miles few times a week ,  ,   patient says she is doing well   patient blood pressure is control   patient had stress test showing fixed anteroseptal anterior ,apical , inferoseptal wall with EF 67%  no ischemia noted       Relates she is intolerant to Imdur and Ranexa  to date 2/2 rash.    , losartan was discontinued due to hyperkalemia   Labs jan 30 24   LDL 99 HDL 61  , patient says she is compliant to medication  serum calcium 10.5 , 11.4  now 10.4 ,   PTH elevated ,  K 5.4  creatinine 1.25   HB a1c 6.0

## 2024-06-19 ENCOUNTER — RX RENEWAL (OUTPATIENT)
Age: 74
End: 2024-06-19

## 2024-06-19 RX ORDER — ATORVASTATIN CALCIUM 40 MG/1
40 TABLET, FILM COATED ORAL
Qty: 90 | Refills: 1 | Status: ACTIVE | COMMUNITY
Start: 2019-09-23 | End: 1900-01-01

## 2024-09-16 ENCOUNTER — APPOINTMENT (OUTPATIENT)
Dept: CARDIOLOGY | Facility: CLINIC | Age: 74
End: 2024-09-16

## 2024-09-18 ENCOUNTER — RX RENEWAL (OUTPATIENT)
Age: 74
End: 2024-09-18

## 2024-10-16 ENCOUNTER — RX RENEWAL (OUTPATIENT)
Age: 74
End: 2024-10-16